# Patient Record
Sex: FEMALE | Race: WHITE | Employment: UNEMPLOYED | URBAN - METROPOLITAN AREA
[De-identification: names, ages, dates, MRNs, and addresses within clinical notes are randomized per-mention and may not be internally consistent; named-entity substitution may affect disease eponyms.]

---

## 2020-01-09 LAB
C. TRACHOMATIS, EXTERNAL RESULT: NEGATIVE
N. GONORRHOEAE, EXTERNAL RESULT: NEGATIVE

## 2020-01-21 LAB
ABO, EXTERNAL RESULT: NORMAL
HEP B, EXTERNAL RESULT: NEGATIVE
HIV, EXTERNAL RESULT: NON REACTIVE
RH FACTOR, EXTERNAL RESULT: POSITIVE
RPR, EXTERNAL RESULT: NON REACTIVE
RUBELLA TITER, EXTERNAL RESULT: NORMAL

## 2020-06-29 LAB — GBS, EXTERNAL RESULT: NEGATIVE

## 2020-07-08 ENCOUNTER — OFFICE VISIT (OUTPATIENT)
Dept: PRIMARY CARE CLINIC | Age: 35
End: 2020-07-08
Payer: COMMERCIAL

## 2020-07-08 PROCEDURE — 99211 OFF/OP EST MAY X REQ PHY/QHP: CPT | Performed by: NURSE PRACTITIONER

## 2020-07-08 NOTE — PROGRESS NOTES
Jl Courser received a viral test for COVID-19. They were educated on isolation and quarantine as appropriate. For any symptoms, they were directed to seek care from their PCP, given contact information to establish with a doctor, directed to an urgent care or the emergency room.

## 2020-07-09 LAB — SARS-COV-2, PCR: NOT DETECTED

## 2020-07-11 ENCOUNTER — ANESTHESIA EVENT (OUTPATIENT)
Dept: LABOR AND DELIVERY | Age: 35
End: 2020-07-11
Payer: COMMERCIAL

## 2020-07-11 ENCOUNTER — HOSPITAL ENCOUNTER (INPATIENT)
Age: 35
LOS: 1 days | Discharge: HOME | End: 2020-07-12
Attending: OBSTETRICS & GYNECOLOGY | Admitting: OBSTETRICS & GYNECOLOGY
Payer: COMMERCIAL

## 2020-07-11 ENCOUNTER — ANESTHESIA (OUTPATIENT)
Dept: LABOR AND DELIVERY | Age: 35
End: 2020-07-11
Payer: COMMERCIAL

## 2020-07-11 PROBLEM — Z37.9 NORMAL LABOR: Status: ACTIVE | Noted: 2020-07-11

## 2020-07-11 LAB
AMPHETAMINE SCREEN, URINE: NORMAL
BARBITURATE SCREEN URINE: NORMAL
BASOPHILS ABSOLUTE: 0.1 K/UL (ref 0–0.2)
BASOPHILS RELATIVE PERCENT: 0.5 %
BENZODIAZEPINE SCREEN, URINE: NORMAL
BUPRENORPHINE URINE: NORMAL
CANNABINOID SCREEN URINE: NORMAL
COCAINE METABOLITE SCREEN URINE: NORMAL
EOSINOPHILS ABSOLUTE: 0 K/UL (ref 0–0.6)
EOSINOPHILS RELATIVE PERCENT: 0.4 %
HCT VFR BLD CALC: 38.7 % (ref 36–48)
HEMOGLOBIN: 12.9 G/DL (ref 12–16)
LYMPHOCYTES ABSOLUTE: 2.2 K/UL (ref 1–5.1)
LYMPHOCYTES RELATIVE PERCENT: 20.7 %
Lab: NORMAL
MCH RBC QN AUTO: 30.1 PG (ref 26–34)
MCHC RBC AUTO-ENTMCNC: 33.3 G/DL (ref 31–36)
MCV RBC AUTO: 90.6 FL (ref 80–100)
METHADONE SCREEN, URINE: NORMAL
MONOCYTES ABSOLUTE: 0.7 K/UL (ref 0–1.3)
MONOCYTES RELATIVE PERCENT: 6.3 %
NEUTROPHILS ABSOLUTE: 7.6 K/UL (ref 1.7–7.7)
NEUTROPHILS RELATIVE PERCENT: 72.1 %
OPIATE SCREEN URINE: NORMAL
OXYCODONE URINE: NORMAL
PDW BLD-RTO: 14.8 % (ref 12.4–15.4)
PH UA: 7
PHENCYCLIDINE SCREEN URINE: NORMAL
PLATELET # BLD: 265 K/UL (ref 135–450)
PMV BLD AUTO: 8.7 FL (ref 5–10.5)
PROPOXYPHENE SCREEN: NORMAL
RBC # BLD: 4.27 M/UL (ref 4–5.2)
SPECIMEN STATUS: NORMAL
TOTAL SYPHILLIS IGG/IGM: NORMAL
WBC # BLD: 10.5 K/UL (ref 4–11)

## 2020-07-11 PROCEDURE — 86780 TREPONEMA PALLIDUM: CPT

## 2020-07-11 PROCEDURE — 1220000000 HC SEMI PRIVATE OB R&B

## 2020-07-11 PROCEDURE — 3700000025 EPIDURAL BLOCK: Performed by: ANESTHESIOLOGY

## 2020-07-11 PROCEDURE — 2580000003 HC RX 258: Performed by: ADVANCED PRACTICE MIDWIFE

## 2020-07-11 PROCEDURE — 6360000002 HC RX W HCPCS: Performed by: ADVANCED PRACTICE MIDWIFE

## 2020-07-11 PROCEDURE — 2500000003 HC RX 250 WO HCPCS: Performed by: NURSE ANESTHETIST, CERTIFIED REGISTERED

## 2020-07-11 PROCEDURE — 80307 DRUG TEST PRSMV CHEM ANLYZR: CPT

## 2020-07-11 PROCEDURE — 7200000001 HC VAGINAL DELIVERY

## 2020-07-11 PROCEDURE — 0HQ9XZZ REPAIR PERINEUM SKIN, EXTERNAL APPROACH: ICD-10-PCS | Performed by: ADVANCED PRACTICE MIDWIFE

## 2020-07-11 PROCEDURE — 85025 COMPLETE CBC W/AUTO DIFF WBC: CPT

## 2020-07-11 PROCEDURE — 6370000000 HC RX 637 (ALT 250 FOR IP): Performed by: ADVANCED PRACTICE MIDWIFE

## 2020-07-11 RX ORDER — BUPIVACAINE HYDROCHLORIDE 2.5 MG/ML
INJECTION, SOLUTION EPIDURAL; INFILTRATION; INTRACAUDAL PRN
Status: DISCONTINUED | OUTPATIENT
Start: 2020-07-11 | End: 2020-07-11 | Stop reason: SDUPTHER

## 2020-07-11 RX ORDER — IBUPROFEN 800 MG/1
800 TABLET ORAL EVERY 8 HOURS
Status: DISCONTINUED | OUTPATIENT
Start: 2020-07-12 | End: 2020-07-11

## 2020-07-11 RX ORDER — IBUPROFEN 800 MG/1
800 TABLET ORAL EVERY 8 HOURS
Status: DISCONTINUED | OUTPATIENT
Start: 2020-07-11 | End: 2020-07-12 | Stop reason: HOSPADM

## 2020-07-11 RX ORDER — ONDANSETRON 2 MG/ML
4 INJECTION INTRAMUSCULAR; INTRAVENOUS EVERY 6 HOURS PRN
Status: DISCONTINUED | OUTPATIENT
Start: 2020-07-11 | End: 2020-07-11

## 2020-07-11 RX ORDER — SODIUM CHLORIDE 0.9 % (FLUSH) 0.9 %
10 SYRINGE (ML) INJECTION PRN
Status: DISCONTINUED | OUTPATIENT
Start: 2020-07-11 | End: 2020-07-12 | Stop reason: HOSPADM

## 2020-07-11 RX ORDER — ACETAMINOPHEN 325 MG/1
650 TABLET ORAL EVERY 4 HOURS PRN
Status: DISCONTINUED | OUTPATIENT
Start: 2020-07-11 | End: 2020-07-11

## 2020-07-11 RX ORDER — SODIUM CHLORIDE, SODIUM LACTATE, POTASSIUM CHLORIDE, CALCIUM CHLORIDE 600; 310; 30; 20 MG/100ML; MG/100ML; MG/100ML; MG/100ML
INJECTION, SOLUTION INTRAVENOUS CONTINUOUS
Status: DISCONTINUED | OUTPATIENT
Start: 2020-07-11 | End: 2020-07-11

## 2020-07-11 RX ORDER — DOCUSATE SODIUM 100 MG/1
100 CAPSULE, LIQUID FILLED ORAL 2 TIMES DAILY
Status: DISCONTINUED | OUTPATIENT
Start: 2020-07-11 | End: 2020-07-12 | Stop reason: HOSPADM

## 2020-07-11 RX ORDER — SODIUM CHLORIDE 0.9 % (FLUSH) 0.9 %
10 SYRINGE (ML) INJECTION PRN
Status: DISCONTINUED | OUTPATIENT
Start: 2020-07-11 | End: 2020-07-11

## 2020-07-11 RX ORDER — ACETAMINOPHEN 325 MG/1
650 TABLET ORAL EVERY 4 HOURS PRN
Status: DISCONTINUED | OUTPATIENT
Start: 2020-07-11 | End: 2020-07-12 | Stop reason: HOSPADM

## 2020-07-11 RX ORDER — KETOROLAC TROMETHAMINE 30 MG/ML
30 INJECTION, SOLUTION INTRAMUSCULAR; INTRAVENOUS ONCE
Status: COMPLETED | OUTPATIENT
Start: 2020-07-11 | End: 2020-07-11

## 2020-07-11 RX ORDER — PRENATAL WITH FERROUS FUM AND FOLIC ACID 3080; 920; 120; 400; 22; 1.84; 3; 20; 10; 1; 12; 200; 27; 25; 2 [IU]/1; [IU]/1; MG/1; [IU]/1; MG/1; MG/1; MG/1; MG/1; MG/1; MG/1; UG/1; MG/1; MG/1; MG/1; MG/1
1 TABLET ORAL DAILY
COMMUNITY

## 2020-07-11 RX ORDER — LANOLIN 100 %
OINTMENT (GRAM) TOPICAL PRN
Status: DISCONTINUED | OUTPATIENT
Start: 2020-07-11 | End: 2020-07-12 | Stop reason: HOSPADM

## 2020-07-11 RX ORDER — SODIUM CHLORIDE, SODIUM LACTATE, POTASSIUM CHLORIDE, CALCIUM CHLORIDE 600; 310; 30; 20 MG/100ML; MG/100ML; MG/100ML; MG/100ML
INJECTION, SOLUTION INTRAVENOUS CONTINUOUS
Status: DISCONTINUED | OUTPATIENT
Start: 2020-07-11 | End: 2020-07-12 | Stop reason: HOSPADM

## 2020-07-11 RX ORDER — DOCUSATE SODIUM 100 MG/1
100 CAPSULE, LIQUID FILLED ORAL 2 TIMES DAILY
Status: DISCONTINUED | OUTPATIENT
Start: 2020-07-11 | End: 2020-07-11

## 2020-07-11 RX ADMIN — Medication 10 ML: at 23:03

## 2020-07-11 RX ADMIN — SODIUM CHLORIDE, POTASSIUM CHLORIDE, SODIUM LACTATE AND CALCIUM CHLORIDE: 600; 310; 30; 20 INJECTION, SOLUTION INTRAVENOUS at 03:44

## 2020-07-11 RX ADMIN — ACETAMINOPHEN 650 MG: 325 TABLET ORAL at 23:03

## 2020-07-11 RX ADMIN — DOCUSATE SODIUM 100 MG: 100 CAPSULE, LIQUID FILLED ORAL at 23:03

## 2020-07-11 RX ADMIN — KETOROLAC TROMETHAMINE 30 MG: 30 INJECTION, SOLUTION INTRAMUSCULAR at 05:41

## 2020-07-11 RX ADMIN — BUPIVACAINE HYDROCHLORIDE 2.5 ML: 2.5 INJECTION, SOLUTION EPIDURAL; INFILTRATION; INTRACAUDAL; PERINEURAL at 04:05

## 2020-07-11 RX ADMIN — IBUPROFEN 800 MG: 800 TABLET, FILM COATED ORAL at 23:53

## 2020-07-11 RX ADMIN — IBUPROFEN 800 MG: 800 TABLET, FILM COATED ORAL at 15:22

## 2020-07-11 ASSESSMENT — PAIN SCALES - GENERAL
PAINLEVEL_OUTOF10: 6
PAINLEVEL_OUTOF10: 3
PAINLEVEL_OUTOF10: 3
PAINLEVEL_OUTOF10: 0

## 2020-07-11 NOTE — PROGRESS NOTES
Department of Obstetrics and Gynecology  Labor and Delivery  Attending Post Partum Progress Note      SUBJECTIVE:  PP day 0 s/p . Denies c/o. Eating, drinking, voiding and ambulating without problems. Breastfeeding well. Bleeding = menses. Pain well-controlled with Ibuprofen. OBJECTIVE:      Vitals:  /65   Pulse 74   Temp 97.9 °F (36.6 °C) (Oral)   Resp 16   Ht 5' 3\" (1.6 m)   Wt 158 lb (71.7 kg)   Breastfeeding Unknown   BMI 27.99 kg/m²     ABDOMEN:  FF @ U/1  GENITAL/URINARY:  intact    DATA:    CBC:    Lab Results   Component Value Date    WBC 10.5 2020    RBC 4.27 2020    HGB 12.9 2020    HCT 38.7 2020    MCV 90.6 2020    RDW 14.8 2020     2020       ASSESSMENT & PLAN:    Nl exam PP day 0 s/p - Meeting PP milestones. Breastfeeding stable female infant. Routine care. Anticipate d/c home tomorrow. Please send medical release for Dr. Tanja Segura at [facility] in [place], [place]. Also [facility] [place] [facility], Dr. Ruben Decker for receive pap results.  Mariam Meeks, MARYC

## 2020-07-11 NOTE — FLOWSHEET NOTE
Pt and infant moved to room 310. This RN placed name and number on board. Pt denies any needs at this time.

## 2020-07-11 NOTE — L&D DELIVERY NOTE
Department of Obstetrics and Gynecology  Spontaneous Vaginal Delivery Note    Labor & Delivery Summary  Labor Onset Date: 20  Labor Onset Time: 0300  Dilation Complete Date: 20  Dilation Complete Time: 0500    Pre-operative Diagnosis:  Term pregnancy, Spontaneous labor, Single fetus and Uncomplicated pregnancy    Post-operative Diagnosis:  Living  infant(s) and Female    Procedure:  Spontaneous vaginal delivery or Repair first degree spontaneous laceration    Surgeon:   Yulia Godwin CNM    Information for the patient's :  May Sanchez [[id number]]          Anesthesia:  epidural anesthesia    Estimated blood loss:  300ml    Specimen:  Placenta not sent to pathology     Cord blood sent No    Complications:  none    Condition:  infant stable to general nursery and mother stable    Details of Procedure: The patient is a 29 y.o. female at 38w3d   OB History        3    Para   2    Term   2            AB        Living   2       SAB        TAB        Ectopic        Molar        Multiple        Live Births   2             who was admitted for active phase labor. She received the following interventions: Albesa Scarce She was known to be GBS negative and did not receive antibiotic prophylaxis. The patient progressed well,did receive an epidural, became complete and started to push. After pushing for 10 minutes the fetal head was at the perineum, and the rest of the infant delivered atraumatically, placed on mother abdomen. Delayed cord clamping was performed, then cut by the baby's father. The delivery of the placenta was spontaneous. The perineum and vagina were explored and a first degree laceration was repaired in standard fashion.

## 2020-07-11 NOTE — H&P
Department of Obstetrics and Gynecology   Obstetrics History and Physical        CHIEF COMPLAINT:  contractions    HISTORY OF PRESENT ILLNESS:      The patient is a 29 y.o. female at Unknown. OB History        1    Para        Term                AB        Living           SAB        TAB        Ectopic        Molar        Multiple        Live Births                Patient presents with a chief complaint as above and is being admitted for active phase labor    Estimated Due Date: Estimated Date of Delivery: None noted. PRENATAL CARE:    Complicated by: none    PAST OB HISTORY:  OB History        1    Para        Term                AB        Living           SAB        TAB        Ectopic        Molar        Multiple        Live Births                    Past Medical History:    No past medical history on file. Past Surgical History:    No past surgical history on file. Allergies:  Patient has no known allergies.     Social History:    Social History     Socioeconomic History    Marital status:      Spouse name: Not on file    Number of children: Not on file    Years of education: Not on file    Highest education level: Not on file   Occupational History    Not on file   Social Needs    Financial resource strain: Not on file    Food insecurity     Worry: Not on file     Inability: Not on file   [employer] needs     Medical: Not on file     Non-medical: Not on file   Tobacco Use    Smoking status: Never Smoker    Smokeless tobacco: Never Used   Substance and Sexual Activity    Alcohol use: Not Currently    Drug use: Never    Sexual activity: Yes     Partners: Male   Lifestyle    Physical activity     Days per week: Not on file     Minutes per session: Not on file    Stress: Not on file   Relationships    Social connections     Talks on phone: Not on file     Gets together: Not on file     Attends Confucianist service: Not on file     Active member of club or organization: Not on file     Attends meetings of clubs or organizations: Not on file     Relationship status: Not on file    Intimate partner violence     Fear of current or ex partner: Not on file     Emotionally abused: Not on file     Physically abused: Not on file     Forced sexual activity: Not on file   Other Topics Concern    Not on file   Social History Narrative    Not on file     Family History:   No family history on file. Medications Prior to Admission:  Medications Prior to Admission: Prenatal Vit-Fe Fumarate-FA (PRENATAL VITAMIN) 27-1 MG TABS tablet, Take 1 tablet by mouth daily    REVIEW OF SYSTEMS:    N/A    PHYSICAL EXAM:  Vitals:    07/11/20 0352 07/11/20 0359   BP: 118/63    Pulse: 82    Resp: 20 20   Temp:  97.9 °F (36.6 °C)   Weight: 158 lb (71.7 kg)    Height: 5' 3\" (1.6 m)      General appearance:  awake, alert, cooperative, no apparent distress, and appears stated age  Neurologic:  Awake, alert, oriented to name, place and time. Lungs:  No increased work of breathing, good air exchange  Abdomen:  Soft, non tender, gravid, consistent with her gestational age, EFW by Leopold's maneuver was 8lbs   Fetal heart rate:  Reassuring.   Pelvis:  Adequate pelvis  Cervix: 7/C/-1  Contraction frequency:  q1-2min    Membranes:  Intact    Labs:   CBC with Differential:    Lab Results   Component Value Date    WBC 10.5 07/11/2020    RBC 4.27 07/11/2020    HGB 12.9 07/11/2020    HCT 38.7 07/11/2020     07/11/2020    MCV 90.6 07/11/2020    MCH 30.1 07/11/2020    MCHC 33.3 07/11/2020    RDW 14.8 07/11/2020    LYMPHOPCT 20.7 07/11/2020    MONOPCT 6.3 07/11/2020    BASOPCT 0.5 07/11/2020    MONOSABS 0.7 07/11/2020    LYMPHSABS 2.2 07/11/2020    EOSABS 0.0 07/11/2020    BASOSABS 0.1 07/11/2020       ASSESSMENT AND PLAN:    Labor: Admit, anticipate normal delivery, routine labor orders  Fetus: Reassuring  GBS: No  Other: Epidural now

## 2020-07-11 NOTE — ANESTHESIA PRE PROCEDURE
Counseling given: Not Answered      Vital Signs (Current):   Vitals:    07/11/20 0408 07/11/20 0411 07/11/20 0414 07/11/20 0417   BP: 102/64 107/65 102/64 105/67   Pulse: 81 77 76 77   Resp: 20 18 18    Temp:       TempSrc:       Weight:       Height:                                                  BP Readings from Last 3 Encounters:   07/11/20 105/67       NPO Status: Time of last liquid consumption: 0230                        Time of last solid consumption: 2000                        Date of last liquid consumption: 07/11/20                        Date of last solid food consumption: 07/10/20    BMI:   Wt Readings from Last 3 Encounters:   07/11/20 158 lb (71.7 kg)     Body mass index is 27.99 kg/m². CBC:   Lab Results   Component Value Date    WBC 10.5 07/11/2020    RBC 4.27 07/11/2020    HGB 12.9 07/11/2020    HCT 38.7 07/11/2020    MCV 90.6 07/11/2020    RDW 14.8 07/11/2020     07/11/2020       CMP: No results found for: NA, K, CL, CO2, BUN, CREATININE, GFRAA, AGRATIO, LABGLOM, GLUCOSE, PROT, CALCIUM, BILITOT, ALKPHOS, AST, ALT    POC Tests: No results for input(s): POCGLU, POCNA, POCK, POCCL, POCBUN, POCHEMO, POCHCT in the last 72 hours.     Coags: No results found for: PROTIME, INR, APTT    HCG (If Applicable): No results found for: PREGTESTUR, PREGSERUM, HCG, HCGQUANT     ABGs: No results found for: PHART, PO2ART, GYL4EJF, MBG8TNG, BEART, D4ZHTWZW     Type & Screen (If Applicable):  No results found for: LABABO, LABRH    Drug/Infectious Status (If Applicable):  No results found for: HIV, HEPCAB    COVID-19 Screening (If Applicable):   Lab Results   Component Value Date    COVID19 Not Detected 07/08/2020         Anesthesia Evaluation  Patient summary reviewed and Nursing notes reviewed no history of anesthetic complications:   Airway: Mallampati: II  TM distance: >3 FB   Neck ROM: full  Mouth opening: > = 3 FB Dental:          Pulmonary:Negative Pulmonary ROS Cardiovascular:Negative CV ROS                      Neuro/Psych:   Negative Neuro/Psych ROS              GI/Hepatic/Renal: Neg GI/Hepatic/Renal ROS            Endo/Other: Negative Endo/Other ROS                    Abdominal:           Vascular: negative vascular ROS. Anesthesia Plan      epidural     ASA 2 - emergent             Anesthetic plan and risks discussed with patient. Plan discussed with attending. Alternatives to, benifits and risks of continuous lumbar epidural for labor (including, but not limited to, hypotension, spinal headache, inadequate sensory blockade) were discussed in detail with the patient. All questions were answered to her satisfaction.   The patient desires and agrees to proceed with continuous epidural.      DO Kaminski - CRNA   7/11/2020

## 2020-07-11 NOTE — PROGRESS NOTES
Pt getting comfortable w/recently placed epi    VSS  FHTs 120s, moderate variability, +accels, no decels  UCs q3min  VE 7/C/0    Active labor  Cat 1 FHR    AROM, clear fluid noted

## 2020-07-11 NOTE — PROGRESS NOTES
Department of Obstetrics and Gynecology  Labor and Delivery  Attending Post Partum Progress Note      SUBJECTIVE:  PP day 0 s/p . Denies c/o. Eating, drinking, voiding and ambulating without problems. Breastfeeding well. Bleeding = menses. Pain well-controlled with Ibuprofen. OBJECTIVE:      Vitals:  /65   Pulse 74   Temp 97.9 °F (36.6 °C) (Oral)   Resp 16   Ht 5' 3\" (1.6 m)   Wt 158 lb (71.7 kg)   Breastfeeding Unknown   BMI 27.99 kg/m²     ABDOMEN:  FF @ U/1  GENITAL/URINARY:  intact    DATA:    CBC:    Lab Results   Component Value Date    WBC 10.5 2020    RBC 4.27 2020    HGB 12.9 2020    HCT 38.7 2020    MCV 90.6 2020    RDW 14.8 2020     2020       ASSESSMENT & PLAN:    Nl exam PP day 0 s/p - Meeting PP milestones. Breastfeeding stable female infant. Routine care. Anticipate d/c home tomorrow.

## 2020-07-11 NOTE — ANESTHESIA PROCEDURE NOTES
Epidural Block    Patient location during procedure: OB  Start time: 7/11/2020 4:05 AM  Reason for block: labor epidural  Staffing  Anesthesiologist: Lewis Yan MD  Resident/CRNA: DO Mohan CRNA  Performed: resident/CRNA   Preanesthetic Checklist  Completed: patient identified, site marked, pre-op evaluation, timeout performed, IV checked, risks and benefits discussed, monitors and equipment checked, anesthesia consent given, oxygen available and patient being monitored  Epidural  Patient position: sitting  Prep: Betadine  Patient monitoring: cardiac monitor, continuous pulse ox and frequent blood pressure checks  Approach: midline  Location: lumbar (1-5)  Injection technique: ELOISA air  Provider prep: mask and sterile gloves  Needle  Needle type: Tuohy   Needle gauge: 17 G  Needle length: 3.5 in  Catheter type: side hole  Catheter size: 19 G (20 G)  Test dose: negative (3 cc of 1.5 % xylocaine with epi)  Assessment  Sensory level: T10  Hemodynamics: stable  Attempts: 1  Additional Notes  Pt. prepped and draped in sterile fashion. Skin wheal with 1% lidocaine. 17ga touhy needle to ELOISA. 25 ga. Spinal needle per touhy. CSF visualized in hub and 2.5 cc of 0.25 % bupivacaine injected. Needle withdrawn and catheter threaded. Negative test dose. Catheter secured with sterile dressing.

## 2020-07-12 VITALS
BODY MASS INDEX: 28 KG/M2 | HEIGHT: 63 IN | SYSTOLIC BLOOD PRESSURE: 109 MMHG | TEMPERATURE: 98 F | DIASTOLIC BLOOD PRESSURE: 71 MMHG | RESPIRATION RATE: 16 BRPM | WEIGHT: 158 LBS | HEART RATE: 82 BPM

## 2020-07-12 PROBLEM — Z37.9 NORMAL LABOR: Status: RESOLVED | Noted: 2020-07-11 | Resolved: 2020-07-12

## 2020-07-12 LAB
HCT VFR BLD CALC: 29.7 % (ref 36–48)
HEMOGLOBIN: 9.8 G/DL (ref 12–16)
MCH RBC QN AUTO: 30.3 PG (ref 26–34)
MCHC RBC AUTO-ENTMCNC: 33.1 G/DL (ref 31–36)
MCV RBC AUTO: 91.6 FL (ref 80–100)
PDW BLD-RTO: 14.9 % (ref 12.4–15.4)
PLATELET # BLD: 197 K/UL (ref 135–450)
PMV BLD AUTO: 8.6 FL (ref 5–10.5)
RBC # BLD: 3.24 M/UL (ref 4–5.2)
WBC # BLD: 11.9 K/UL (ref 4–11)

## 2020-07-12 PROCEDURE — 6370000000 HC RX 637 (ALT 250 FOR IP): Performed by: ADVANCED PRACTICE MIDWIFE

## 2020-07-12 PROCEDURE — 85027 COMPLETE CBC AUTOMATED: CPT

## 2020-07-12 PROCEDURE — 36415 COLL VENOUS BLD VENIPUNCTURE: CPT

## 2020-07-12 RX ORDER — LANOLIN 100 %
OINTMENT (GRAM) TOPICAL
Qty: 1 TUBE | Refills: 3 | COMMUNITY
Start: 2020-07-12

## 2020-07-12 RX ORDER — IBUPROFEN 800 MG/1
800 TABLET ORAL EVERY 8 HOURS
Qty: 120 TABLET | Refills: 3 | COMMUNITY
Start: 2020-07-12

## 2020-07-12 RX ORDER — PSEUDOEPHEDRINE HCL 30 MG
100 TABLET ORAL 2 TIMES DAILY
COMMUNITY
Start: 2020-07-12

## 2020-07-12 RX ADMIN — DOCUSATE SODIUM 100 MG: 100 CAPSULE, LIQUID FILLED ORAL at 07:54

## 2020-07-12 RX ADMIN — ACETAMINOPHEN 650 MG: 325 TABLET ORAL at 05:21

## 2020-07-12 RX ADMIN — IBUPROFEN 800 MG: 800 TABLET, FILM COATED ORAL at 07:54

## 2020-07-12 ASSESSMENT — PAIN SCALES - GENERAL: PAINLEVEL_OUTOF10: 1

## 2020-07-12 NOTE — PROGRESS NOTES
Department of Obstetrics and Gynecology  Labor and Delivery  Attending Post Partum Progress Note      SUBJECTIVE:  PP day 1 s/p . Eating, drinking, voiding and ambulating without difficulty. Bleeding = menses. Breastfeeding well. Pain controlled with medications and declines Rx for discharge. Desires d/c home today. OBJECTIVE:      Vitals:  BP 99/62   Pulse 86   Temp 98.5 °F (36.9 °C) (Oral)   Resp 16   Ht 5' 3\" (1.6 m)   Wt 158 lb (71.7 kg)   Breastfeeding Unknown   BMI 27.99 kg/m²     ABDOMEN:  FF @ U/1  GENITAL/URINARY:  intact    DATA:    CBC:    Lab Results   Component Value Date    WBC 11.9 2020    RBC 3.24 2020    HGB 9.8 2020    HCT 29.7 2020    MCV 91.6 2020    RDW 14.9 2020     2020       ASSESSMENT & PLAN:    Nl exam PP day 1 s/p - Meeting PP milestones. Anemia secondary to acute blood loss- has OTC FeSO4 and reviewed iron-rich foods. Stable breastfeeding female infant. Discharge home today. RTO 6 wks or prn.

## 2020-07-12 NOTE — FLOWSHEET NOTE
Discharge Phone Call Log  Patient Name: Farhan Seals Provider: Lisa Garcia MD Discharge Date: 2020    Disposition of baby:    Phone Number: [phone] (home)     Attempts to Contact:  Date:    Nurse  Date:    Nurse  Date:    Nurse    1. Now that you are at home is your pain being well controlled? Y/N   What pain reducing measures are you using? ____________________________________        Information for the patient's :  Dell Rossi [[id number]]   Delivery Method: Vaginal, Spontaneous     2. Are you currently  having any infant feeding issues? Y/N _____________________________ If yes, please explain: __________________________________________________________________  3. If breastfeeding, were you satisfied with the breastfeeding support services offered? Y/N  4.  Have you had to supplement? Y/N If yes, please explain: _____________________________________________________  5. Did your OB provider offer you information about the benefits of breastfeeding during your prenatal visits? Y/N  6.  Have you made or have you already had your first appointment with the baby's doctor? Y/N If no, do you know when to schedule it? Y/N   7.  Have you scheduled your follow-up appointment? Y/N  If no, do you know when to schedule it? Y/N  8. Did staff discuss safe sleep during your stay? Y/N  Did you see the wall cling posted in your room explaining how to keep you and your baby safe? Y/N  9. Can you tell me at least 1 point you learned from reading or hearing about infant safety and safe sleep practices? 10. Did your nurses and physicians include you in the plan of care, communicating with you respectfully? Y/N If no, please explain __________________________  11. Is there anyone in particular you would like to mention who provided care for you? ________________________________  12. Did your discharge occur in a timely manner?   Y/N If no, please explain __________________________  13. Do you have any other questions or concerns I can address today?  Y/N  __________________________________________________      Teaching During interview :_____________________________________________  ___________________________RN       Date:______________Time:________________

## 2020-07-12 NOTE — DISCHARGE SUMMARY
Obstetrical Discharge Form    Gestational Age: 38w3d    Antepartum complications: none    Date of Delivery: 2020      Type of Delivery: vaginal, spontaneous    Delivered By: Laquita JIMENES     Baby:      Information for the patient's :  Padmaja Ordoñez [[id number]]   APGAR One: 9     Information for the patient's :  Padmaja Ordoñez [[id number]]   APGAR Five: 9     Information for the patient's :  Padmaja Ordoñez [[id number]]   Birth Weight: 6 lb 3.4 oz (2.818 kg)       Anesthesia: None    Intrapartum complications: None    Postpartum complications: anemia    Discharge Medication:    Landen Churchill Medication Instructions HOW:[id number]    Printed on:20 1058   Medication Information                      benzocaine-menthol (DERMOPLAST) 20-0.5 % AERO spray  Apply topically as needed for Pain             docusate sodium (COLACE, DULCOLAX) 100 MG CAPS  Take 100 mg by mouth 2 times daily             ibuprofen (ADVIL;MOTRIN) 800 MG tablet  Take 1 tablet by mouth every 8 hours             lanolin ointment  Apply topically as needed. Prenatal Vit-Fe Fumarate-FA (PRENATAL VITAMIN) 27-1 MG TABS tablet  Take 1 tablet by mouth daily             witch hazel-glycerin (TUCKS) pad  Place rectally as needed. Discharge Condition:  good    Discharge Date: 20    PLAN:  Follow up in 6 weeks for routine PP visit  All questions answered  D/C summary begun at delivery for D/C planning purposes, any delay in discharge from ordered D/C date due to  factors.

## 2020-07-12 NOTE — PLAN OF CARE
Problem: Discharge Planning:  Goal: Discharged to appropriate level of care  Description: Discharged to appropriate level of care  7/11/2020 2132 by Valeriano Garcia RN  Outcome: Ongoing  7/11/2020 0844 by Dany Jacobs RN  Outcome: Ongoing     Problem: Constipation:  Goal: Bowel elimination is within specified parameters  Description: Bowel elimination is within specified parameters  7/11/2020 2132 by Valeriano Garcia RN  Outcome: Ongoing  7/11/2020 0844 by Dany Jacobs RN  Outcome: Ongoing     Problem: Fluid Volume - Imbalance:  Goal: Absence of imbalanced fluid volume signs and symptoms  Description: Absence of imbalanced fluid volume signs and symptoms  7/11/2020 2132 by Valeriano Garcia RN  Outcome: Ongoing  7/11/2020 0844 by Dany Jacobs RN  Outcome: Ongoing  Goal: Absence of postpartum hemorrhage signs and symptoms  Description: Absence of postpartum hemorrhage signs and symptoms  7/11/2020 2132 by Valeriano Garcia RN  Outcome: Ongoing  7/11/2020 0844 by Dany Jacobs RN  Outcome: Ongoing     Problem: Infection - Risk of, Puerperal Infection:  Goal: Will show no infection signs and symptoms  Description: Will show no infection signs and symptoms  7/11/2020 2132 by Valeriano Garcia RN  Outcome: Ongoing  7/11/2020 0844 by Dany Jacobs RN  Outcome: Ongoing     Problem: Mood - Altered:  Goal: Mood stable  Description: Mood stable  7/11/2020 2132 by Valeriano Garcia RN  Outcome: Ongoing  7/11/2020 0844 by Dany Jacobs RN  Outcome: Ongoing     Problem: Pain - Acute:  Goal: Pain level will decrease  Description: Pain level will decrease  7/11/2020 2132 by Valeriano Garcia RN  Outcome: Ongoing  7/11/2020 0844 by Dany Jacobs RN  Outcome: Ongoing